# Patient Record
Sex: FEMALE | Race: OTHER | NOT HISPANIC OR LATINO | URBAN - METROPOLITAN AREA
[De-identification: names, ages, dates, MRNs, and addresses within clinical notes are randomized per-mention and may not be internally consistent; named-entity substitution may affect disease eponyms.]

---

## 2018-07-15 ENCOUNTER — EMERGENCY (EMERGENCY)
Facility: HOSPITAL | Age: 23
LOS: 1 days | Discharge: HOME | End: 2018-07-15
Attending: EMERGENCY MEDICINE | Admitting: EMERGENCY MEDICINE
Payer: COMMERCIAL

## 2018-07-15 VITALS
SYSTOLIC BLOOD PRESSURE: 100 MMHG | OXYGEN SATURATION: 100 % | RESPIRATION RATE: 16 BRPM | TEMPERATURE: 100 F | HEART RATE: 100 BPM | HEIGHT: 63 IN | WEIGHT: 130.07 LBS | DIASTOLIC BLOOD PRESSURE: 71 MMHG

## 2018-07-15 VITALS
RESPIRATION RATE: 16 BRPM | OXYGEN SATURATION: 100 % | HEART RATE: 86 BPM | TEMPERATURE: 99 F | DIASTOLIC BLOOD PRESSURE: 68 MMHG | SYSTOLIC BLOOD PRESSURE: 110 MMHG

## 2018-07-15 LAB
ALBUMIN SERPL ELPH-MCNC: 3.4 G/DL — SIGNIFICANT CHANGE UP (ref 3.3–5)
ALP SERPL-CCNC: 77 U/L — SIGNIFICANT CHANGE UP (ref 40–120)
ALT FLD-CCNC: 34 U/L DA — SIGNIFICANT CHANGE UP (ref 10–45)
ANION GAP SERPL CALC-SCNC: 8 MMOL/L — SIGNIFICANT CHANGE UP (ref 5–17)
AST SERPL-CCNC: 21 U/L — SIGNIFICANT CHANGE UP (ref 10–40)
BASOPHILS # BLD AUTO: 0 K/UL — SIGNIFICANT CHANGE UP (ref 0–0.2)
BASOPHILS NFR BLD AUTO: 0.5 % — SIGNIFICANT CHANGE UP (ref 0–2)
BILIRUB SERPL-MCNC: 0.9 MG/DL — SIGNIFICANT CHANGE UP (ref 0.2–1.2)
BUN SERPL-MCNC: 17 MG/DL — SIGNIFICANT CHANGE UP (ref 7–23)
CALCIUM SERPL-MCNC: 8.5 MG/DL — SIGNIFICANT CHANGE UP (ref 8.4–10.5)
CHLORIDE SERPL-SCNC: 104 MMOL/L — SIGNIFICANT CHANGE UP (ref 96–108)
CO2 SERPL-SCNC: 28 MMOL/L — SIGNIFICANT CHANGE UP (ref 22–31)
CREAT SERPL-MCNC: 0.81 MG/DL — SIGNIFICANT CHANGE UP (ref 0.5–1.3)
EOSINOPHIL # BLD AUTO: 0 K/UL — SIGNIFICANT CHANGE UP (ref 0–0.5)
EOSINOPHIL NFR BLD AUTO: 0.3 % — SIGNIFICANT CHANGE UP (ref 0–6)
GLUCOSE SERPL-MCNC: 107 MG/DL — HIGH (ref 70–99)
HCT VFR BLD CALC: 42.9 % — SIGNIFICANT CHANGE UP (ref 34.5–45)
HGB BLD-MCNC: 14 G/DL — SIGNIFICANT CHANGE UP (ref 11.5–15.5)
LYMPHOCYTES # BLD AUTO: 1.3 K/UL — SIGNIFICANT CHANGE UP (ref 1–3.3)
LYMPHOCYTES # BLD AUTO: 16.3 % — SIGNIFICANT CHANGE UP (ref 13–44)
MCHC RBC-ENTMCNC: 29.5 PG — SIGNIFICANT CHANGE UP (ref 27–34)
MCHC RBC-ENTMCNC: 32.6 GM/DL — SIGNIFICANT CHANGE UP (ref 32–36)
MCV RBC AUTO: 90.3 FL — SIGNIFICANT CHANGE UP (ref 80–100)
MONOCYTES # BLD AUTO: 0.5 K/UL — SIGNIFICANT CHANGE UP (ref 0–0.9)
MONOCYTES NFR BLD AUTO: 6.1 % — SIGNIFICANT CHANGE UP (ref 2–14)
NEUTROPHILS # BLD AUTO: 5.9 K/UL — SIGNIFICANT CHANGE UP (ref 1.8–7.4)
NEUTROPHILS NFR BLD AUTO: 76.8 % — SIGNIFICANT CHANGE UP (ref 43–77)
PLATELET # BLD AUTO: 196 K/UL — SIGNIFICANT CHANGE UP (ref 150–400)
POTASSIUM SERPL-MCNC: 3.4 MMOL/L — LOW (ref 3.5–5.3)
POTASSIUM SERPL-SCNC: 3.4 MMOL/L — LOW (ref 3.5–5.3)
PROT SERPL-MCNC: 7.2 G/DL — SIGNIFICANT CHANGE UP (ref 6–8.3)
RBC # BLD: 4.74 M/UL — SIGNIFICANT CHANGE UP (ref 3.8–5.2)
RBC # FLD: 11.3 % — SIGNIFICANT CHANGE UP (ref 10.3–14.5)
SODIUM SERPL-SCNC: 140 MMOL/L — SIGNIFICANT CHANGE UP (ref 135–145)
WBC # BLD: 7.7 K/UL — SIGNIFICANT CHANGE UP (ref 3.8–10.5)
WBC # FLD AUTO: 7.7 K/UL — SIGNIFICANT CHANGE UP (ref 3.8–10.5)

## 2018-07-15 PROCEDURE — 80053 COMPREHEN METABOLIC PANEL: CPT

## 2018-07-15 PROCEDURE — 96375 TX/PRO/DX INJ NEW DRUG ADDON: CPT

## 2018-07-15 PROCEDURE — 85027 COMPLETE CBC AUTOMATED: CPT

## 2018-07-15 PROCEDURE — 99284 EMERGENCY DEPT VISIT MOD MDM: CPT

## 2018-07-15 PROCEDURE — 96374 THER/PROPH/DIAG INJ IV PUSH: CPT

## 2018-07-15 PROCEDURE — 99284 EMERGENCY DEPT VISIT MOD MDM: CPT | Mod: 25

## 2018-07-15 PROCEDURE — 96361 HYDRATE IV INFUSION ADD-ON: CPT

## 2018-07-15 RX ORDER — ONDANSETRON 8 MG/1
4 TABLET, FILM COATED ORAL ONCE
Qty: 0 | Refills: 0 | Status: COMPLETED | OUTPATIENT
Start: 2018-07-15 | End: 2018-07-15

## 2018-07-15 RX ORDER — SODIUM CHLORIDE 9 MG/ML
1000 INJECTION INTRAMUSCULAR; INTRAVENOUS; SUBCUTANEOUS ONCE
Qty: 0 | Refills: 0 | Status: COMPLETED | OUTPATIENT
Start: 2018-07-15 | End: 2018-07-15

## 2018-07-15 RX ORDER — FAMOTIDINE 10 MG/ML
20 INJECTION INTRAVENOUS ONCE
Qty: 0 | Refills: 0 | Status: DISCONTINUED | OUTPATIENT
Start: 2018-07-15 | End: 2018-07-15

## 2018-07-15 RX ORDER — FAMOTIDINE 10 MG/ML
20 INJECTION INTRAVENOUS ONCE
Qty: 0 | Refills: 0 | Status: COMPLETED | OUTPATIENT
Start: 2018-07-15 | End: 2018-07-15

## 2018-07-15 RX ADMIN — ONDANSETRON 4 MILLIGRAM(S): 8 TABLET, FILM COATED ORAL at 22:38

## 2018-07-15 RX ADMIN — Medication 30 MILLILITER(S): at 22:38

## 2018-07-15 RX ADMIN — FAMOTIDINE 100 MILLIGRAM(S): 10 INJECTION INTRAVENOUS at 22:38

## 2018-07-15 RX ADMIN — SODIUM CHLORIDE 1000 MILLILITER(S): 9 INJECTION INTRAMUSCULAR; INTRAVENOUS; SUBCUTANEOUS at 22:38

## 2018-07-15 NOTE — ED ADULT NURSE REASSESSMENT NOTE - NS ED NURSE REASSESS COMMENT FT1
Received pt from KENDAL Franco. pt states that she is feeling better. No active vomiting noted at this time. pending disposition.

## 2018-07-15 NOTE — ED PROVIDER NOTE - OBJECTIVE STATEMENT
22 yr old female with no pmhx presents c/o diarrhea, nausea, vomiting x1, and intermittent generalized abdominal pain s/p eating chicken and rice last night. reports chills. Denies any urinary complaints, chest pain, sob, fever or any other symptoms at this time.

## 2019-04-02 ENCOUNTER — EMERGENCY (EMERGENCY)
Facility: HOSPITAL | Age: 24
LOS: 1 days | Discharge: HOME | End: 2019-04-02
Attending: EMERGENCY MEDICINE | Admitting: EMERGENCY MEDICINE
Payer: COMMERCIAL

## 2019-04-02 VITALS
SYSTOLIC BLOOD PRESSURE: 120 MMHG | TEMPERATURE: 97 F | DIASTOLIC BLOOD PRESSURE: 81 MMHG | OXYGEN SATURATION: 98 % | HEART RATE: 105 BPM | RESPIRATION RATE: 18 BRPM | HEIGHT: 66 IN | WEIGHT: 225.09 LBS

## 2019-04-02 VITALS
DIASTOLIC BLOOD PRESSURE: 64 MMHG | RESPIRATION RATE: 16 BRPM | TEMPERATURE: 98 F | OXYGEN SATURATION: 98 % | HEART RATE: 98 BPM | SYSTOLIC BLOOD PRESSURE: 108 MMHG

## 2019-04-02 DIAGNOSIS — R11.0 NAUSEA: ICD-10-CM

## 2019-04-02 PROCEDURE — 99284 EMERGENCY DEPT VISIT MOD MDM: CPT | Mod: 25

## 2019-04-02 PROCEDURE — 96374 THER/PROPH/DIAG INJ IV PUSH: CPT

## 2019-04-02 PROCEDURE — 99284 EMERGENCY DEPT VISIT MOD MDM: CPT

## 2019-04-02 RX ORDER — SODIUM CHLORIDE 9 MG/ML
1000 INJECTION INTRAMUSCULAR; INTRAVENOUS; SUBCUTANEOUS ONCE
Qty: 0 | Refills: 0 | Status: COMPLETED | OUTPATIENT
Start: 2019-04-02 | End: 2019-04-02

## 2019-04-02 RX ORDER — METOCLOPRAMIDE HCL 10 MG
10 TABLET ORAL ONCE
Qty: 0 | Refills: 0 | Status: COMPLETED | OUTPATIENT
Start: 2019-04-02 | End: 2019-04-02

## 2019-04-02 RX ORDER — ACETAMINOPHEN 500 MG
650 TABLET ORAL ONCE
Qty: 0 | Refills: 0 | Status: COMPLETED | OUTPATIENT
Start: 2019-04-02 | End: 2019-04-02

## 2019-04-02 RX ADMIN — Medication 650 MILLIGRAM(S): at 18:52

## 2019-04-02 RX ADMIN — SODIUM CHLORIDE 1000 MILLILITER(S): 9 INJECTION INTRAMUSCULAR; INTRAVENOUS; SUBCUTANEOUS at 18:52

## 2019-04-02 RX ADMIN — Medication 10 MILLIGRAM(S): at 18:52

## 2019-04-02 NOTE — ED PROVIDER NOTE - CLINICAL SUMMARY MEDICAL DECISION MAKING FREE TEXT BOX
22 yo F URI symptoms, headache, & nausea x 1 day. VSS. NAD, well appearing. PE unremarkable.   - tylenol & reglan   - fluids  - ucg  - reassess

## 2019-04-02 NOTE — ED PROVIDER NOTE - NSFOLLOWUPINSTRUCTIONS_ED_ALL_ED_FT
You should drink plenty of fluids and get plenty of rest. You should take OTC pain medications as directed. You should follow up with your PCP as soon as possible. You should return to the ED if your symptoms worsen or change.

## 2019-04-02 NOTE — ED PROVIDER NOTE - OBJECTIVE STATEMENT
24 yo F no pmHx, never sexually active, here today for URI symptoms, headache, and nausea x 1 day. Patient states that yesterday she woke up with congestion, dry cough, and generalized dull headache. Patient states that she saw her PCP about one month ago for a similar headache who gave her prednisone, patient states symptoms improve only partially. Patient notes that she took dayquil today around 4pm and afterwards began to feel nauseous. 24 yo F no pmHx, never sexually active, here today for URI symptoms, headache, and nausea x 1 day. Patient states that yesterday she woke up with congestion, dry cough, and generalized dull headache. Patient states that she saw her PCP about one month ago for a similar headache who gave her prednisone, patient states symptoms improve only partially. Patient notes that she took dayquil today around 4pm and afterwards began to feel nauseous. Patient notes associated dry-heaving, denies vomitting. She states she has been able to tolerate PO liquids and solids. States BMs normal. She denies history of similar symptoms before in the past. 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 She denies any fever, chills, chest pain, SOB, dysuria, hematuria, flank pain, frequency, vomitting, diarrhea, weakness, numbness, or tingling. 24 yo F no pmHx, never sexually active, here today for URI symptoms, headache, and nausea x 1 day. Patient states that yesterday she woke up with congestion, dry cough, and generalized dull headache. Patient states that she saw her PCP about one month ago for a similar headache who gave her prednisone, patient states symptoms improve only partially. Currently denies any numbness, tingling, weakness, slurred speech, confusion, LOC, worst headache of life. Patient notes that she took dayquil today around 4pm and afterwards began to feel nauseous. Patient notes associated dry-heaving, denies vomitting. She states she has been able to tolerate PO liquids and solids. States BMs normal. She denies history of similar symptoms before in the past. 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 Denies alcohol or drug use. Denies excessive caffeine - states one cup of tea per day. She denies any fever, chills, chest pain, SOB, dysuria, hematuria, flank pain, frequency, vomitting, diarrhea, weakness, numbness, or tingling.

## 2019-04-02 NOTE — ED PROVIDER NOTE - ATTENDING CONTRIBUTION TO CARE
Eval with JASVIR Carrasco. Patient with rhinorrhea, congestion, nausea x 1 day. Pt with generalized dull headache. Plan for symptom management. Ears throat lungs clear. No lymphadenopathy. Plan for fluids and meds for headache and nausea. I performed a face to face bedside interview with patient regarding history of present illness, review of symptoms and past medical history. I completed an independent physical exam.  I have discussed the patient's plan of care with Physician Assistant (PA). I agree with note as stated above, having amended the EMR as needed to reflect my findings.   This includes History of Present Illness, HIV, Past Medical/Surgical/Family/Social History, Allergies and Home Medications, Review of Systems, Physical Exam, and any Progress Notes during the time I functioned as the attending physician for this patient.

## 2022-10-21 NOTE — ED PROVIDER NOTE - MEDICAL DECISION MAKING DETAILS
[FreeTextEntry1] : She returns for BP management. 22 yr old female with no pmhx presents c/o diarrhea, nausea, vomiting x1, and intermittent generalized abdominal pain s/p eating chicken and rice last night. reports chills. likely gastroenteritis: abdomen- soft, non tendern:  will check cbc, cmp, ivf, GI cocktail and re eval Mela: 22 yr old female with no pmhx presents c/o diarrhea, nausea, vomiting x1, and intermittent generalized abdominal pain s/p eating chicken and rice last night. reports chills. likely gastroenteritis: abdomen- soft, non tendern:  will check cbc, cmp, ivf, GI cocktail and re eval